# Patient Record
Sex: FEMALE | Race: WHITE
[De-identification: names, ages, dates, MRNs, and addresses within clinical notes are randomized per-mention and may not be internally consistent; named-entity substitution may affect disease eponyms.]

---

## 2017-12-19 ENCOUNTER — HOSPITAL ENCOUNTER (OUTPATIENT)
Dept: HOSPITAL 53 - M WHC | Age: 47
End: 2017-12-19
Attending: NURSE PRACTITIONER
Payer: COMMERCIAL

## 2017-12-19 DIAGNOSIS — Z12.31: Primary | ICD-10-CM

## 2017-12-19 NOTE — REPMRS
Patient History

The patient states she had a clinical breast exam in 12/2017.

Family history of breast cancer in maternal aunt under age 50.

 

Digital Woman Screen Mammo: December 19, 2017 - Exam #: 

[id number]

Bilateral CC and MLO view(s) were taken.

 

Technologist: Ann Stephens, Technologist

Prior study comparison: August 12, 2016, digital woman screen 

mammo performed at [facility].  March 17, 2014, 

digital woman screen mammo performed at [facility].

 

FINDINGS: There are scattered fibroglandular densities.  There 

has been no change in the appearance of the mammogram from the 

prior studies.  There is a mild amount of residual fibroglandular

tissue which is fairly symmetric. There is no interval 

development of dominant mass, architectural distortion, or 

clustered microcalcification suggestive of malignancy.

 

ASSESSMENT: BI-RADS/ACR category 1 mammogram. Negative.

 

Recommendation

Routine screening mammogram in 1 year (for women over age 40).

This mammogram was interpreted with the aid of an FDA-approved 

computer-aided dectection system.

 

Electronically Signed By: Donnell Ryan MD 12/19/17 0902

## 2018-12-20 ENCOUNTER — HOSPITAL ENCOUNTER (OUTPATIENT)
Dept: HOSPITAL 53 - M WHC | Age: 48
End: 2018-12-20
Attending: NURSE PRACTITIONER
Payer: COMMERCIAL

## 2018-12-20 DIAGNOSIS — Z12.31: Primary | ICD-10-CM

## 2018-12-20 DIAGNOSIS — Z78.0: ICD-10-CM

## 2018-12-20 DIAGNOSIS — Z80.3: ICD-10-CM

## 2018-12-20 NOTE — REPMRS
Patient History

The patient states she had a clinical breast exam in 12/2018.

Patient is postmenopausal.

Family history of breast cancer under age 50 in maternal aunt.

No Hormone Replacement Therapy

 

Digital Woman Screen Mammo: December 20, 2018 - Exam #: 

[id number]

Bilateral CC and MLO view(s) were taken.

 

Technologist: Ann Stephens, Technologist

Prior study comparison: December 19, 2017, digital woman screen 

mammo performed at [facility].  August 12, 2016, 

digital woman screen mammo performed at [facility].

March 17, 2014, digital woman screen mammo performed at 

[facility].

 

FINDINGS: There are scattered fibroglandular densities.  There 

has been no change in the appearance of the mammogram from the 

prior studies.  There is a mild amount of scattered 

fibroglandular density which is fairly symmetric. There is no 

interval development of dominant mass, architectural distortion, 

or clustered microcalcification suggestive of malignancy.

3-D tomosynthesis shows no additional findings.

 

Assessment: BI-RADS/ACR category 1 mammogram. Negative.

 

Recommendation

Routine screening mammogram of both breasts in 1 year (for women 

over age 40).

 

This patient's Lifetime Breast Cancer RIsk is estimated at 13.9 

%.

This mammogram was interpreted with the aid of an FDA-approved 

computer-aided dectection system.

 

Electronically Signed By: Stefano Almonte MD 12/20/18 4532

## 2020-01-28 ENCOUNTER — HOSPITAL ENCOUNTER (OUTPATIENT)
Dept: HOSPITAL 53 - M WHC | Age: 50
End: 2020-01-28
Attending: NURSE PRACTITIONER
Payer: COMMERCIAL

## 2020-01-28 ENCOUNTER — HOSPITAL ENCOUNTER (OUTPATIENT)
Dept: HOSPITAL 53 - M SFHCWAGY | Age: 50
End: 2020-01-28
Attending: NURSE PRACTITIONER
Payer: COMMERCIAL

## 2020-01-28 DIAGNOSIS — Z12.4: Primary | ICD-10-CM

## 2020-01-28 DIAGNOSIS — Z12.31: Primary | ICD-10-CM

## 2020-01-28 PROCEDURE — 87624 HPV HI-RISK TYP POOLED RSLT: CPT

## 2020-01-28 NOTE — REP
BILATERAL SCREENING DIGITAL MAMMOGRAM WITH 3D TOMOSYNTHESIS:

 

There are no palpable abnormalities or other breast complaints.

The the patient states she had a clinical breast examination the January, 2020.

 

The Tyrer-Cuzick Score is: 13.7% .

 

Comparison is 03/17/2014.

 

There are scattered areas of fibroglandular density.

There is no dominant mass, micro calcific cluster or architectural distortion

that would indicate malignancy.

There are no additional findings on 3D tomosynthesiss.

There is no change from the prior study.

 

Impression:

BIRADS/ACR category 1 mammogram.  Negative.

 

Recommendation:

Routine annual screening mammography.

 

This mammogram was interpreted with the aid of a FDA approved computer-aided

detection system.

 

A. Negative mammogram reports should not delay biopsy if a dominant or clinically

suspicious mass is present.

B. Not all breast cancers are identified by mammography or tomosynthesis.

C.  Adenosis and dense breasts may obscure an underlying neoplasm.

 

Patient letter M1.

 

 

Electronically Signed by

Donnell Zhao MD 01/28/2020 10:34 A

## 2020-09-26 ENCOUNTER — HOSPITAL ENCOUNTER (OUTPATIENT)
Dept: HOSPITAL 53 - M LABSMTC | Age: 50
End: 2020-09-26
Attending: ANESTHESIOLOGY
Payer: COMMERCIAL

## 2020-09-26 DIAGNOSIS — Z20.828: ICD-10-CM

## 2020-09-26 DIAGNOSIS — Z01.812: Primary | ICD-10-CM

## 2020-10-01 ENCOUNTER — HOSPITAL ENCOUNTER (OUTPATIENT)
Dept: HOSPITAL 53 - M OPP | Age: 50
Discharge: HOME | End: 2020-10-01
Attending: INTERNAL MEDICINE
Payer: COMMERCIAL

## 2020-10-01 VITALS — SYSTOLIC BLOOD PRESSURE: 113 MMHG | DIASTOLIC BLOOD PRESSURE: 66 MMHG

## 2020-10-01 VITALS — HEIGHT: 67 IN | BODY MASS INDEX: 24.23 KG/M2 | WEIGHT: 154.4 LBS

## 2020-10-01 DIAGNOSIS — K63.5: ICD-10-CM

## 2020-10-01 DIAGNOSIS — Z12.11: Primary | ICD-10-CM

## 2020-10-01 DIAGNOSIS — K64.8: ICD-10-CM

## 2020-10-01 DIAGNOSIS — K57.30: ICD-10-CM

## 2020-10-01 DIAGNOSIS — F17.210: ICD-10-CM

## 2020-10-01 NOTE — ROOR
________________________________________________________________________________

Patient Name: Pamela Mcclure               Procedure Date: 10/1/2020 10:03 AM

MRN: [id number]                          Account Number: [id number]

YOB: 1970                Age: 50

Room: [facility]                            Gender: Female

Note Status: Finalized                 

________________________________________________________________________________

 

Procedure:           Colonoscopy

Indications:         Screening for colorectal malignant neoplasm

Providers:           Juan MONTALVO MD

Referring MD:        Tarah Delgado NP

Requesting Provider: 

Medicines:           Monitored Anesthesia Care

Complications:       No immediate complications.

________________________________________________________________________________

Procedure:           Pre-Anesthesia Assessment:

                     - The heart rate, respiratory rate, oxygen saturations, 

                     blood pressure, adequacy of pulmonary ventilation, and 

                     response to care were monitored throughout the procedure.

                     The Colonoscope was introduced through the anus and 

                     advanced to the terminal ileum, with identification of 

                     the appendiceal orifice and IC valve. The colonoscopy was 

                     performed without difficulty. The patient tolerated the 

                     procedure well. The quality of the bowel preparation was 

                     good.

                                                                                

Findings:

     The perianal and digital rectal examinations were normal.

     Three sessile polyps were found in the recto-sigmoid colon, ascending 

     colon and cecum. The polyps were 3 to 5 mm in size. These polyps were 

     removed with a cold snare. Resection and retrieval were complete.

     The exam was otherwise without abnormality on direct and retroflexion 

     views.

     Mild sigmoid diverticulosis and small internal hemorrhoids.

                                                                                

Impression:          - Three 3 to 5 mm polyps at the recto-sigmoid colon, in 

                     the ascending colon and in the cecum, removed with a cold 

                     snare. Resected and retrieved.

                     - Minimal sigmoid diverticulosis and small internal 

                     hemorrhoids.

                     - The colon examination was otherwise normal on direct 

                     and retroflexion views.

Recommendation:      - Repeat colonoscopy in 3 - 5 years for surveillance 

                     based on pathology results.

                     - Await pathology results.

                     - Telephone endoscopist for pathology results in 2 weeks.

                                                                                

 

Juan Montalvo MD

________________

Juan MONTALVO MD

10/1/2020 10:26:26 AM

Electronically signed by Juan MONTALVO MD

Number of Addenda: 0

 

Note Initiated On: 10/1/2020 10:03 AM

Estimated Blood Loss:

     Estimated blood loss: none.

## 2021-02-09 ENCOUNTER — HOSPITAL ENCOUNTER (OUTPATIENT)
Dept: HOSPITAL 53 - M WHC | Age: 51
End: 2021-02-09
Attending: NURSE PRACTITIONER
Payer: COMMERCIAL

## 2021-02-09 DIAGNOSIS — Z12.31: Primary | ICD-10-CM

## 2021-02-09 NOTE — REPMRS
Patient History

The patient states she had a clinical breast exam in 2/2021

Family history of breast cancer under age 50 in maternal aunt.

No Hormone Replacement Therapy

 

Digital Woman Screen Mammo: February 9, 2021 - Exam #: 

[id number]

Bilateral CC and MLO view(s) were taken.

 

Technologist: Chanelle Alonzo, Technologist

Prior study comparison: January 28, 2020, bilateral digital woman

screen mammo performed at [facility].  December 20, 2018, bilateral digital woman screen 

mammo performed at [facility].  December 19, 2017, digital woman screen mammo performed 

at [facility].

 

FINDINGS: There are scattered fibroglandular densities.  The 

Volpara volumetric breast density category is:B.  There has been 

no change in the appearance of the mammogram from the prior 

studies.  There is a mild amount of scattered fibroglandular 

density which is fairly symmetric. There is no interval 

development of dominant mass, architectural distortion, or 

grouped microcalcification suggestive of malignancy.

3-D tomosynthesis shows no additional findings.

 

Assessment: BI-RADS/ACR category 1 mammogram. Negative Mammogram.

 

Recommendation

Routine screening mammogram of both breasts in 1 year (for women 

over age 40).

This patient's  [facility] Lifetime Breast Cancer Risk is 

estimated at 13.4 %.

This mammogram was interpreted with the aid of an FDA-approved 

computer-aided dectection system.

 

Electronically Signed By: Stefano Almonte MD 02/09/21 4794

## 2021-03-11 ENCOUNTER — HOSPITAL ENCOUNTER (OUTPATIENT)
Dept: HOSPITAL 53 - M SFHCCLAY | Age: 51
End: 2021-03-11
Attending: NURSE PRACTITIONER
Payer: COMMERCIAL

## 2021-03-11 DIAGNOSIS — Z13.220: ICD-10-CM

## 2021-03-11 DIAGNOSIS — Z00.00: Primary | ICD-10-CM

## 2021-03-11 DIAGNOSIS — F17.200: ICD-10-CM

## 2021-03-11 LAB
ALBUMIN SERPL BCG-MCNC: 4 GM/DL (ref 3.2–5.2)
ALT SERPL W P-5'-P-CCNC: 44 U/L (ref 12–78)
BASOPHILS # BLD AUTO: 0 10^3/UL (ref 0–0.2)
BASOPHILS NFR BLD AUTO: 0.6 % (ref 0–1)
BILIRUB SERPL-MCNC: 0.4 MG/DL (ref 0.2–1)
BUN SERPL-MCNC: 18 MG/DL (ref 7–18)
CALCIUM SERPL-MCNC: 9.1 MG/DL (ref 8.5–10.1)
CHLORIDE SERPL-SCNC: 110 MEQ/L (ref 98–107)
CHOLEST SERPL-MCNC: 270 MG/DL (ref ?–200)
CHOLEST/HDLC SERPL: 3.25 {RATIO} (ref ?–5)
CO2 SERPL-SCNC: 26 MEQ/L (ref 21–32)
CREAT SERPL-MCNC: 0.74 MG/DL (ref 0.55–1.3)
EOSINOPHIL # BLD AUTO: 0.1 10^3/UL (ref 0–0.5)
EOSINOPHIL NFR BLD AUTO: 2.3 % (ref 0–3)
GFR SERPL CREATININE-BSD FRML MDRD: > 60 ML/MIN/{1.73_M2} (ref 51–?)
GLUCOSE SERPL-MCNC: 107 MG/DL (ref 70–100)
HCT VFR BLD AUTO: 45.8 % (ref 36–47)
HDLC SERPL-MCNC: 83 MG/DL (ref 40–?)
HGB BLD-MCNC: 15.6 G/DL (ref 12–15.5)
LDLC SERPL CALC-MCNC: 161 MG/DL (ref ?–100)
LYMPHOCYTES # BLD AUTO: 1.6 10^3/UL (ref 1.5–5)
LYMPHOCYTES NFR BLD AUTO: 31 % (ref 24–44)
MCH RBC QN AUTO: 32.9 PG (ref 27–33)
MCHC RBC AUTO-ENTMCNC: 34.1 G/DL (ref 32–36.5)
MCV RBC AUTO: 96.6 FL (ref 80–96)
MONOCYTES # BLD AUTO: 0.4 10^3/UL (ref 0–0.8)
MONOCYTES NFR BLD AUTO: 8.3 % (ref 2–8)
NEUTROPHILS # BLD AUTO: 3 10^3/UL (ref 1.5–8.5)
NEUTROPHILS NFR BLD AUTO: 57.4 % (ref 36–66)
NONHDLC SERPL-MCNC: 187 MG/DL
PLATELET # BLD AUTO: 292 10^3/UL (ref 150–450)
POTASSIUM SERPL-SCNC: 4.6 MEQ/L (ref 3.5–5.1)
PROT SERPL-MCNC: 7.1 GM/DL (ref 6.4–8.2)
RBC # BLD AUTO: 4.74 10^6/UL (ref 4–5.4)
SODIUM SERPL-SCNC: 142 MEQ/L (ref 136–145)
T4 FREE SERPL-MCNC: 0.75 NG/DL (ref 0.76–1.46)
TRIGL SERPL-MCNC: 131 MG/DL (ref ?–150)
TSH SERPL DL<=0.005 MIU/L-ACNC: 2.49 UIU/ML (ref 0.36–3.74)
WBC # BLD AUTO: 5.2 10^3/UL (ref 4–10)

## 2021-04-30 ENCOUNTER — HOSPITAL ENCOUNTER (OUTPATIENT)
Dept: HOSPITAL 53 - M RAD | Age: 51
End: 2021-04-30
Attending: NURSE PRACTITIONER
Payer: COMMERCIAL

## 2021-04-30 DIAGNOSIS — Z87.891: Primary | ICD-10-CM

## 2021-04-30 NOTE — REP
INDICATION:

HX OF SMOKING.



COMPARISON:

None.



TECHNIQUE:

Axial noncontrast images from the thoracic inlet to the upper abdomen using low-dose

lung screening technique (LDCT).  As per the protocol only lung window images were

sent to the read station for interpretation



FINDINGS:

There is biapical pleuroparenchymal scarring.  There are no other abnormal nodules,

masses, or opacities.



Grossly, the mediastinum and pulmonary kvng are within normal limits.

Grossly, the imaged upper abdomen and imaged osseous structures are within normal

limits.



IMPRESSION:

There is biapical pleuroparenchymal scarring.  There is no revised Fleischner society

criteria recommendation for the follow-up of such abnormality.  Since are no priors

comparison I would recommend a short interval follow-up to ensure stability.





<Electronically signed by Jacky Smith > 04/30/21 1952

## 2022-03-21 ENCOUNTER — HOSPITAL ENCOUNTER (OUTPATIENT)
Dept: HOSPITAL 53 - M PLAIMG | Age: 52
End: 2022-03-21
Attending: NURSE PRACTITIONER
Payer: COMMERCIAL

## 2022-03-21 DIAGNOSIS — J94.8: Primary | ICD-10-CM

## 2022-03-21 DIAGNOSIS — Z87.891: ICD-10-CM

## 2022-03-21 DIAGNOSIS — F17.200: ICD-10-CM

## 2022-08-17 ENCOUNTER — HOSPITAL ENCOUNTER (OUTPATIENT)
Dept: HOSPITAL 53 - M WHC | Age: 52
End: 2022-08-17
Attending: ADVANCED PRACTICE MIDWIFE
Payer: COMMERCIAL

## 2022-08-17 DIAGNOSIS — Z12.31: Primary | ICD-10-CM

## 2023-03-21 ENCOUNTER — HOSPITAL ENCOUNTER (OUTPATIENT)
Dept: HOSPITAL 53 - M SFHCCLAY | Age: 53
End: 2023-03-21
Attending: NURSE PRACTITIONER
Payer: COMMERCIAL

## 2023-03-21 DIAGNOSIS — Z00.00: Primary | ICD-10-CM

## 2023-03-21 DIAGNOSIS — F17.200: ICD-10-CM

## 2023-03-21 DIAGNOSIS — J94.8: ICD-10-CM

## 2023-03-21 DIAGNOSIS — Z13.220: ICD-10-CM

## 2023-03-21 DIAGNOSIS — Z87.891: ICD-10-CM

## 2023-03-21 LAB
ALBUMIN SERPL BCG-MCNC: 4 G/DL (ref 3.2–5.2)
ALP SERPL-CCNC: 82 U/L (ref 46–116)
ALT SERPL W P-5'-P-CCNC: 36 U/L (ref 7–40)
AST SERPL-CCNC: 25 U/L (ref ?–34)
BASOPHILS # BLD AUTO: 0 10^3/UL (ref 0–0.2)
BASOPHILS NFR BLD AUTO: 0.2 % (ref 0–1)
BILIRUB SERPL-MCNC: 0.6 MG/DL (ref 0.3–1.2)
BUN SERPL-MCNC: 18 MG/DL (ref 9–23)
CALCIUM SERPL-MCNC: 9.4 MG/DL (ref 8.5–10.1)
CHLORIDE SERPL-SCNC: 106 MMOL/L (ref 98–107)
CHOLEST SERPL-MCNC: 248 MG/DL (ref ?–200)
CHOLEST/HDLC SERPL: 2.72 {RATIO} (ref ?–5)
CO2 SERPL-SCNC: 30 MMOL/L (ref 20–31)
CREAT SERPL-MCNC: 0.63 MG/DL (ref 0.55–1.3)
EOSINOPHIL # BLD AUTO: 0.1 10^3/UL (ref 0–0.5)
EOSINOPHIL NFR BLD AUTO: 1.2 % (ref 0–3)
GFR SERPL CREATININE-BSD FRML MDRD: > 60 ML/MIN/{1.73_M2} (ref 51–?)
GLUCOSE SERPL-MCNC: 108 MG/DL (ref 60–100)
HCT VFR BLD AUTO: 45.8 % (ref 36–47)
HDLC SERPL-MCNC: 90.9 MG/DL (ref 40–?)
HGB BLD-MCNC: 15.4 G/DL (ref 12–15.5)
LDLC SERPL CALC-MCNC: 139.9 MG/DL (ref ?–100)
LYMPHOCYTES # BLD AUTO: 2.1 10^3/UL (ref 1.5–5)
LYMPHOCYTES NFR BLD AUTO: 25.1 % (ref 24–44)
MCH RBC QN AUTO: 32.8 PG (ref 27–33)
MCHC RBC AUTO-ENTMCNC: 33.6 G/DL (ref 32–36.5)
MCV RBC AUTO: 97.4 FL (ref 80–96)
MONOCYTES # BLD AUTO: 0.6 10^3/UL (ref 0–0.8)
MONOCYTES NFR BLD AUTO: 7.7 % (ref 2–8)
NEUTROPHILS # BLD AUTO: 5.5 10^3/UL (ref 1.5–8.5)
NEUTROPHILS NFR BLD AUTO: 65.4 % (ref 36–66)
NONHDLC SERPL-MCNC: 157.1 MG/DL
PLATELET # BLD AUTO: 316 10^3/UL (ref 150–450)
POTASSIUM SERPL-SCNC: 3.9 MMOL/L (ref 3.5–5.1)
PROT SERPL-MCNC: 6.7 G/DL (ref 5.7–8.2)
RBC # BLD AUTO: 4.7 10^6/UL (ref 4–5.4)
SODIUM SERPL-SCNC: 141 MMOL/L (ref 136–145)
T4 FREE SERPL-MCNC: 0.99 NG/DL (ref 0.89–1.76)
TRIGL SERPL-MCNC: 86 MG/DL (ref ?–150)
TSH SERPL DL<=0.005 MIU/L-ACNC: 2.28 UIU/ML (ref 0.55–4.78)
WBC # BLD AUTO: 8.3 10^3/UL (ref 4–10)

## 2023-05-03 ENCOUNTER — HOSPITAL ENCOUNTER (OUTPATIENT)
Dept: HOSPITAL 53 - M RAD | Age: 53
End: 2023-05-03
Attending: NURSE PRACTITIONER
Payer: COMMERCIAL

## 2023-05-03 DIAGNOSIS — F17.210: ICD-10-CM

## 2023-05-03 DIAGNOSIS — Z12.2: Primary | ICD-10-CM

## 2023-09-21 ENCOUNTER — HOSPITAL ENCOUNTER (OUTPATIENT)
Dept: HOSPITAL 53 - M PLALAB | Age: 53
End: 2023-09-21
Attending: ADVANCED PRACTICE MIDWIFE
Payer: COMMERCIAL

## 2023-09-21 ENCOUNTER — HOSPITAL ENCOUNTER (OUTPATIENT)
Dept: HOSPITAL 53 - M WHC | Age: 53
End: 2023-09-21
Attending: ADVANCED PRACTICE MIDWIFE
Payer: COMMERCIAL

## 2023-09-21 DIAGNOSIS — R87.610: ICD-10-CM

## 2023-09-21 DIAGNOSIS — Z12.4: Primary | ICD-10-CM

## 2023-09-21 DIAGNOSIS — Z12.31: Primary | ICD-10-CM

## 2023-09-21 PROCEDURE — 87624 HPV HI-RISK TYP POOLED RSLT: CPT

## 2024-06-14 ENCOUNTER — HOSPITAL ENCOUNTER (OUTPATIENT)
Dept: HOSPITAL 53 - M RAD | Age: 54
End: 2024-06-14
Attending: NURSE PRACTITIONER
Payer: COMMERCIAL

## 2024-06-14 DIAGNOSIS — Z12.2: Primary | ICD-10-CM

## 2024-06-14 DIAGNOSIS — F17.210: ICD-10-CM
